# Patient Record
Sex: FEMALE | Race: WHITE | NOT HISPANIC OR LATINO | URBAN - NONMETROPOLITAN AREA
[De-identification: names, ages, dates, MRNs, and addresses within clinical notes are randomized per-mention and may not be internally consistent; named-entity substitution may affect disease eponyms.]

---

## 2020-03-02 ENCOUNTER — OFFICE VISIT (OUTPATIENT)
Dept: CARDIOLOGY | Facility: CLINIC | Age: 65
End: 2020-03-02

## 2020-03-02 VITALS
SYSTOLIC BLOOD PRESSURE: 122 MMHG | OXYGEN SATURATION: 96 % | WEIGHT: 161.2 LBS | HEIGHT: 72 IN | HEART RATE: 91 BPM | BODY MASS INDEX: 21.83 KG/M2 | DIASTOLIC BLOOD PRESSURE: 60 MMHG

## 2020-03-02 DIAGNOSIS — R07.2 PRECORDIAL PAIN: Primary | ICD-10-CM

## 2020-03-02 PROCEDURE — 99214 OFFICE O/P EST MOD 30 MIN: CPT | Performed by: NURSE PRACTITIONER

## 2020-03-02 RX ORDER — LIOTHYRONINE SODIUM 50 UG/1
50 TABLET ORAL DAILY
COMMUNITY

## 2020-03-02 RX ORDER — LOSARTAN POTASSIUM 50 MG/1
50 TABLET ORAL DAILY
COMMUNITY

## 2020-03-02 RX ORDER — DESVENLAFAXINE 100 MG/1
100 TABLET, EXTENDED RELEASE ORAL DAILY
COMMUNITY

## 2020-03-02 NOTE — PROGRESS NOTES
Chest Pain (chief complaint)      History of Present Illness        Referral from Dr. Everett for chest pain along with fatigue, HTN, and right arm pain. Has long history of HTN and HLD.        Cardiac Risk Factors:  hypercholesterolemia, hypertension and smoking      Past Medical History:   Diagnosis Date   • Depression      Past Surgical History:   Procedure Laterality Date   • APPENDECTOMY     •  SECTION     • GALLBLADDER SURGERY     • SUBTOTAL HYSTERECTOMY W/SALPINGECTOMY - OPEN     • TONSILECTOMY, ADENOIDECTOMY, BILATERAL MYRINGOTOMY AND TUBES       Social History     Socioeconomic History   • Marital status:      Spouse name: Not on file   • Number of children: Not on file   • Years of education: Not on file   • Highest education level: Not on file   Tobacco Use   • Smoking status: Current Every Day Smoker     Types: Electronic Cigarette   • Smokeless tobacco: Never Used   • Tobacco comment: quit smoking cigarettes 5 years ago she now vapes   Substance and Sexual Activity   • Alcohol use: Never     Frequency: Never   • Drug use: Never     History reviewed. No pertinent family history.    ALLERGIES:  Allergies   Allergen Reactions   • Contrast Dye Anaphylaxis   • Corticosteroids Other (See Comments)   • Penicillins Rash   • Sulfa Antibiotics Rash         Review of Systems   Constitution: Negative for malaise/fatigue.   Cardiovascular: Positive for chest pain. Negative for dyspnea on exertion, orthopnea and palpitations.   Respiratory: Negative for cough and shortness of breath.    Neurological: Negative for dizziness and weakness.       Current Outpatient Medications   Medication Sig Dispense Refill   • Desvenlafaxine  MG tablet sustained-release 24 hour Take 100 mg by mouth Daily.     • liothyronine (CYTOMEL) 50 MCG tablet Take 50 mcg by mouth Daily.     • losartan (COZAAR) 50 MG tablet Take 50 mg by mouth Daily. Take 1/2 tablet per day       No current facility-administered medications  "for this visit.        OBJECTIVE:    Physical Exam:   Physical Exam   Constitutional: She is oriented to person, place, and time. She appears well-developed and well-nourished. No distress.   HENT:   Head: Normocephalic and atraumatic.   Neck: Normal range of motion. No JVD present.   Cardiovascular: Normal rate, regular rhythm, S1 normal, S2 normal, normal heart sounds and intact distal pulses.   No murmur heard.  Pulmonary/Chest: Effort normal and breath sounds normal. No respiratory distress. She has no wheezes. She has no rales.   Abdominal: Soft. Bowel sounds are normal.   Musculoskeletal: Normal range of motion. She exhibits no edema.   Neurological: She is alert and oriented to person, place, and time.   Skin: Skin is warm and dry. No erythema.   Psychiatric: She has a normal mood and affect. Her behavior is normal. Judgment and thought content normal.     Vitals:    03/02/20 1417   BP: 122/60   BP Location: Left arm   Patient Position: Sitting   Cuff Size: Adult   Pulse: 91   SpO2: 96%   Weight: 73.1 kg (161 lb 3.2 oz)   Height: 182.9 cm (72\")       DATA REVIEWED:        No radiology results for the last 30 days.       Labs: BMP, CBC, LIPID, TSH  No results found for: GLUCOSE, CALCIUM, NA, K, CO2, CL, BUN, CREATININE, EGFRIFAFRI, EGFRIFNONA, BCR, ANIONGAP  No results found for: WBC, HGB, HCT, MCV, PLT  No results found for: CHOL  No results found for: TRIG  No results found for: HDL  No components found for: LDLCALC  No results found for: LDL  No results found for: HDLLDLRATIO  No components found for: CHOLHDL  No results found for: TSH  No results found for: PROBNP  EKG:           The following portions of the patient's history were reviewed and updated as appropriate: allergies, current medications, past family history, past medical history, past social history, past surgical history and problem list.  Old records reviewed and pertinent information is included in the above objective data. "     ASSESSMENT/PLAN:       Diagnosis Plan   1. Precordial pain  Chest pain syndrome. Pain characteristic: atypical angina   Patient is Moderate Risk.     Ischemic evaluation:ordered.    The patient is not able to exercise    EKG is Normal  Risks/Benefits discussed  Ischemia evaluation with Exercise Cardiolite  TTE   Basic Labs, PA/LA CXR (results reviewed if completed)    Reasons for non-cardiac chest pain:  Pneumonia  GERD  Esophageal Spasm  Musculoskeletal pain  Peptic Ulcer Disease  Cholecystitis  Pancreatitis  Symptomatic anemia  Vasoactive Drug Use  Anxiety  Mediastinitis            Follow up with phone call with results.           This document has been electronically signed by WOODROW Bautista on March 2, 2020 3:10 PM

## 2020-03-23 ENCOUNTER — APPOINTMENT (OUTPATIENT)
Dept: NUCLEAR MEDICINE | Facility: HOSPITAL | Age: 65
End: 2020-03-23

## 2020-03-23 ENCOUNTER — APPOINTMENT (OUTPATIENT)
Dept: CARDIOLOGY | Facility: HOSPITAL | Age: 65
End: 2020-03-23